# Patient Record
Sex: MALE | Race: BLACK OR AFRICAN AMERICAN | NOT HISPANIC OR LATINO | Employment: FULL TIME | URBAN - METROPOLITAN AREA
[De-identification: names, ages, dates, MRNs, and addresses within clinical notes are randomized per-mention and may not be internally consistent; named-entity substitution may affect disease eponyms.]

---

## 2017-03-23 DIAGNOSIS — G40.309 GENERALIZED CONVULSIVE EPILEPSY WITHOUT INTRACTABLE EPILEPSY (HCC): ICD-10-CM

## 2017-03-23 RX ORDER — LEVETIRACETAM 500 MG/1
TABLET ORAL
Qty: 180 TAB | Refills: 3 | Status: SHIPPED | OUTPATIENT
Start: 2017-03-23 | End: 2019-01-23

## 2018-01-25 ENCOUNTER — OFFICE VISIT (OUTPATIENT)
Dept: URGENT CARE | Facility: CLINIC | Age: 38
End: 2018-01-25
Payer: COMMERCIAL

## 2018-01-25 VITALS
OXYGEN SATURATION: 99 % | DIASTOLIC BLOOD PRESSURE: 80 MMHG | BODY MASS INDEX: 21.76 KG/M2 | HEART RATE: 95 BPM | WEIGHT: 152 LBS | TEMPERATURE: 99.7 F | SYSTOLIC BLOOD PRESSURE: 110 MMHG | HEIGHT: 70 IN

## 2018-01-25 DIAGNOSIS — J10.1 INFLUENZA B: ICD-10-CM

## 2018-01-25 LAB
FLUAV+FLUBV AG SPEC QL IA: NORMAL
INT CON NEG: NORMAL
INT CON NEG: NORMAL
INT CON POS: NORMAL
INT CON POS: NORMAL
S PYO AG THROAT QL: NEGATIVE

## 2018-01-25 PROCEDURE — 87880 STREP A ASSAY W/OPTIC: CPT | Performed by: FAMILY MEDICINE

## 2018-01-25 PROCEDURE — 87804 INFLUENZA ASSAY W/OPTIC: CPT | Performed by: FAMILY MEDICINE

## 2018-01-25 PROCEDURE — 99204 OFFICE O/P NEW MOD 45 MIN: CPT | Performed by: FAMILY MEDICINE

## 2018-01-25 RX ORDER — OSELTAMIVIR PHOSPHATE 75 MG/1
75 CAPSULE ORAL 2 TIMES DAILY
Qty: 10 CAP | Refills: 0 | Status: SHIPPED | OUTPATIENT
Start: 2018-01-25 | End: 2018-01-30

## 2018-01-26 NOTE — PATIENT INSTRUCTIONS
Chantelle Influenza   Chantelle influenza is also known as bird flu. Chantelle influenza is a group of viruses that occurs naturally in wild and domestic birds. Chantelle influenza is easily spread (contagious) among birds and is deadly to them. Though rare, chantelle influenza can cause severe illness in humans.  CAUSES  Infected birds can spread chantelle influenza through:  · Feces.  · Nasal secretions.  · Saliva.  Birds become infected when they come into contact with infected birds or contaminated surfaces. The chantelle influenza virus is spread from country to country through the international poultry trade or by migrating birds.  Chantelle influenza can then infect humans who:  · Have contact with infected birds. The birds can be dead or alive.  · Breathe in contaminated dust.  · Touch contaminated surfaces.  It is rare for there to be human-to-human transmission of chantelle influenza. However, influenza viruses can change, so human-to-human transmission may become more likely in the future.  RISK FACTORS  The main risk factor for chantelle influenza is having exposure to birds or poultry.  SIGNS AND SYMPTOMS  · Fever.  · Cough.  · Sore throat.  · Nausea and vomiting.  · Diarrhea.  · Muscle aches.  · Tiredness (fatigue).  · Inflammation or redness of the eyes (conjunctivitis).  · Shortness of breath.  · Difficulty breathing.  · Abdominal pain.  · Seizures.  · Mental confusion.  DIAGNOSIS  Diagnosis may include:  · Medical history and physical exam.  · Chest X-ray.  · Examination of a fluid sample from your throat or nose.  · Blood tests.  TREATMENT  Treatment for chantelle influenza may include:  · Antiviral medicines.  · Supportive care to relieve your symptoms.  HOME CARE INSTRUCTIONS  · Take medicines only as directed by your health care provider.  · Use a cool mist humidifier. This makes breathing easier.  · Rest as directed by your health care provider.  · Drink enough fluid to keep your urine clear or pale yellow.  · Cover your mouth and nose  when coughing or sneezing.  · Wash your hands well to prevent the virus from spreading.  · Avoid crowded areas. Stay home from work or school until directed by your health care provider.  PREVENTION  · Stay home from work and school when you are sick. Not being in contact with other people will help stop the spread of illness.  · Cover your mouth and nose with your arm when coughing or sneezing. This may help keep those around you from getting sick.  · Wash your hands often with warm water and soap. Illnesses are often spread when a person touches something that is contaminated with germs and then touches his or her eyes, nose, or mouth.  · If you think you have been exposed to chantelle influenza, ask your health care provider about preventative antiviral medicines. These can help prevent infection from occurring.  SEEK MEDICAL CARE IF:  · You have a fever.  · You have new symptoms.  · Your symptoms get worse.  · Your symptoms do not get better with treatment.  SEEK IMMEDIATE MEDICAL CARE IF:  · Your skin or nails turn bluish.  · You have a skin rash.  · You are urinating noticeably less or not at all.  · You have chest pain.  · You have trouble breathing.  · You feel like your heart is fluttering, skipping a beat, or beating faster than normal.  · You have confusion.  · You have chills, weakness, or light-headedness.  · You develop a sudden headache, or pain in your face or ear.  · You have severe pain or stiffness in your neck.  · You cough up blood.  · You have nausea and vomiting that you cannot control.        This information is not intended to replace advice given to you by your health care provider. Make sure you discuss any questions you have with your health care provider.     Document Released: 12/20/2004 Document Revised: 01/08/2016 Document Reviewed: 07/21/2015  [employer] Interactive Patient Education ©2016 [employer]

## 2018-01-26 NOTE — PROGRESS NOTES
"Chief Complaint   Patient presents with   • Cough     chest congestion,headache,sneezing x2days          Cough  This is a new problem. The current episode started yesterday. The problem has been unchanged. The problem occurs constantly. The cough is dry. Associated symptoms include : fatigue, headaches, chills, muscle aches, fever.  Sx are worse with exertion.    Pertinent negatives include no   nausea, vomiting, diarrhea, sweats, weight loss or wheezing. Nothing aggravates the symptoms.  Patient has tried nothing for the symptoms. There is no history of asthma.        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  .  Negative for wheezing.    Neurological: Negative for headaches, dizziness   GI - denies nausea, vomiting or diarrhea   - denies dysuria, discharge  Psych - denies depression, anxiety  Neuro - denies numbness or tingling.   10 point ROS otherwise negative, except per HPI             Objective:     Blood pressure 110/80, pulse 95, temperature 37.6 °C (99.7 °F), height 1.79 m (5' 10.47\"), weight 68.9 kg (152 lb), SpO2 99 %.      Physical Exam   Constitutional: patient is oriented to person, place, and time. Patient appears well-developed and well-nourished. No distress.   HENT:   Head: Normocephalic and atraumatic.   Right Ear: External ear normal.   Left Ear: External ear normal.   TMs normal  Nose: Mucosal edema  present. Right sinus exhibits no maxillary sinus tenderness. Left " sinus exhibits no maxillary sinus tenderness.   Mouth/Throat: Mucous membranes are normal. No oral lesions.  No posterior pharyngeal erythema.  No oropharyngeal exudate or posterior oropharyngeal edema.   Eyes: Conjunctivae and EOM are normal. Pupils are equal, round, and reactive to light. Right eye exhibits no discharge. Left eye exhibits no discharge. No scleral icterus.   Neck: Normal range of motion. Neck supple. No tracheal deviation present.   Cardiovascular: Normal rate, regular rhythm and normal heart sounds.  Exam reveals no friction rub.    Pulmonary/Chest: Effort normal. No respiratory distress. Patient has no wheezes or rhonchi. Patient has no rales.    Musculoskeletal:  exhibits no edema.   Lymphadenopathy:     Patient has no cervical adenopathy.      Neurological: patient is alert and oriented to person, place, and time.   Skin: Skin is warm and dry. No rash noted. No erythema.   Psychiatric: patient  has a normal mood and affect.  behavior is normal.   Nursing note and vitals reviewed.          Assesment/Plan:    Positive for Influenza B    Rx tamiflu    Follow up in one week if no improvement, sooner if symptoms worsen.

## 2018-04-05 ENCOUNTER — OFFICE VISIT (OUTPATIENT)
Dept: URGENT CARE | Facility: CLINIC | Age: 38
End: 2018-04-05
Payer: COMMERCIAL

## 2018-04-05 VITALS
WEIGHT: 152.12 LBS | HEART RATE: 70 BPM | OXYGEN SATURATION: 98 % | BODY MASS INDEX: 21.78 KG/M2 | TEMPERATURE: 97.8 F | RESPIRATION RATE: 16 BRPM | HEIGHT: 70 IN | DIASTOLIC BLOOD PRESSURE: 62 MMHG | SYSTOLIC BLOOD PRESSURE: 100 MMHG

## 2018-04-05 DIAGNOSIS — G40.409 GRAND MAL EPILEPSY, CONTROLLED (HCC): ICD-10-CM

## 2018-04-05 PROCEDURE — 99203 OFFICE O/P NEW LOW 30 MIN: CPT | Performed by: INTERNAL MEDICINE

## 2018-04-05 RX ORDER — LEVETIRACETAM 500 MG/1
500 TABLET ORAL 2 TIMES DAILY
Qty: 60 TAB | Refills: 6 | Status: SHIPPED | OUTPATIENT
Start: 2018-04-05 | End: 2019-01-23 | Stop reason: SDUPTHER

## 2018-04-05 ASSESSMENT — ENCOUNTER SYMPTOMS
MUSCULOSKELETAL NEGATIVE: 1
RESPIRATORY NEGATIVE: 1
EYES NEGATIVE: 1
CONSTITUTIONAL NEGATIVE: 1
SEIZURES: 1
CARDIOVASCULAR NEGATIVE: 1
GASTROINTESTINAL NEGATIVE: 1
PSYCHIATRIC NEGATIVE: 1

## 2018-04-05 NOTE — PROGRESS NOTES
"Sanjay Newberry is a 37 y.o. 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  MEDS:   Current Outpatient Prescriptions:   •  levETIRAcetam (KEPPRA) 500 MG Tab, Take 1 Tab by mouth 2 times a day., Disp: 60 Tab, Rfl: 6  •  levetiracetam (KEPPRA) 500 MG Tab, TAKE ONE TABLET BY MOUTH TWICE DAILY, Disp: 180 Tab, Rfl: 3  ALLERGIES: No Known Allergies  SURGHX: History reviewed. No pertinent surgical history.  SOCHX:  reports that he has never smoked. He has never used smokeless tobacco. He reports that he does not drink alcohol or use drugs.  FH: family history is not on file.    ROS  No Known Allergies   Objective:   /62   Pulse 70   Temp 36.6 °C (97.8 °F)   Resp 16   Ht 1.79 m (5' 10.47\")   Wt 69 kg (152 lb 1.9 oz)   SpO2 98%   BMI 21.54 kg/m²   Physical Exam      Assessment/Plan:   Assessment    1. Grand mal epilepsy, controlled (CMS-[facility])  - levETIRAcetam (KEPPRA) 500 MG Tab; Take 1 Tab by mouth 2 times a day.  Dispense: 60 Tab; Refill: 6  Differential diagnosis, natural history, supportive care, and indications for immediate follow-up discussed.        "

## 2018-04-05 NOTE — PROGRESS NOTES
"Subjective:      Sanjay Newberry is a 37 y.o. male who presents with Medication Refill (medication refill levetiracetam for seizures - pt has appt with neurologist in August)            Patient is a 37-year-old male with long-standing history of grand mal seizures as well as a intracerebral hemorrhage in the past. Patient is here for medication refill of his Keppra. Patient has been seizure-free for several years. Patient denies any other significant complaints. Eating and drinking his bowels and bladder well no other complaints. Recently moved here from [place] Working at [employer]        Review of Systems   Constitutional: Negative.    HENT: Negative.    Eyes: Negative.    Respiratory: Negative.    Cardiovascular: Negative.    Gastrointestinal: Negative.    Genitourinary: Negative.    Musculoskeletal: Negative.    Neurological: Positive for seizures.   Endo/Heme/Allergies: Negative.    Psychiatric/Behavioral: Negative.    All other systems reviewed and are negative.         Objective:     /62   Pulse 70   Temp 36.6 °C (97.8 °F)   Resp 16   Ht 1.79 m (5' 10.47\")   Wt 69 kg (152 lb 1.9 oz)   SpO2 98%   BMI 21.54 kg/m²      Physical Exam   Constitutional: He is oriented to person, place, and time. He appears well-developed and well-nourished.   HENT:   Head: Normocephalic and atraumatic.   Eyes: Lids are normal. Pupils are equal, round, and reactive to light. Left eye exhibits abnormal extraocular motion. Left eye exhibits no nystagmus.   Neck: Normal range of motion. Neck supple.   Cardiovascular: Normal rate and regular rhythm.    Pulmonary/Chest: Effort normal and breath sounds normal.   Neurological: He is alert and oriented to person, place, and time.   Skin: Skin is warm and dry.   Psychiatric: He has a normal mood and affect. His behavior is normal. Judgment and thought content normal.   Nursing note and vitals reviewed.              Assessment/Plan:     1. Grand mal epilepsy, controlled (CMS-[facility])    - " levETIRAcetam (KEPPRA) 500 MG Tab; Take 1 Tab by mouth 2 times a day.  Dispense: 60 Tab; Refill: 6    Patient establishes a neurologist states that he is appointment in August. Patient also to establish with a primary care doctor ASAP.

## 2018-10-31 ENCOUNTER — OFFICE VISIT (OUTPATIENT)
Dept: URGENT CARE | Facility: CLINIC | Age: 38
End: 2018-10-31
Payer: COMMERCIAL

## 2018-10-31 VITALS
DIASTOLIC BLOOD PRESSURE: 60 MMHG | HEIGHT: 74 IN | OXYGEN SATURATION: 98 % | HEART RATE: 74 BPM | BODY MASS INDEX: 19.51 KG/M2 | WEIGHT: 152 LBS | RESPIRATION RATE: 16 BRPM | TEMPERATURE: 97.7 F | SYSTOLIC BLOOD PRESSURE: 132 MMHG

## 2018-10-31 DIAGNOSIS — Z87.898 HISTORY OF SEIZURE: ICD-10-CM

## 2018-10-31 DIAGNOSIS — Z76.0 MEDICATION REFILL: ICD-10-CM

## 2018-10-31 PROCEDURE — 99212 OFFICE O/P EST SF 10 MIN: CPT | Performed by: PHYSICIAN ASSISTANT

## 2018-10-31 RX ORDER — LEVETIRACETAM 500 MG/1
500 TABLET ORAL 2 TIMES DAILY
Qty: 60 TAB | Refills: 2 | Status: SHIPPED | OUTPATIENT
Start: 2018-10-31 | End: 2018-11-30

## 2018-10-31 ASSESSMENT — ENCOUNTER SYMPTOMS
HEADACHES: 0
EYES NEGATIVE: 1
CONSTITUTIONAL NEGATIVE: 1
WEAKNESS: 0
NEUROLOGICAL NEGATIVE: 1

## 2018-10-31 NOTE — PROGRESS NOTES
Subjective:      Sanjay Newberry is a 38 y.o. male who presents with Medication Refill (siezure medication)            Other   This is a chronic (sz hx; stable on meds; needs refill; has Neuro appt Jan 23) problem. The problem has been unchanged. Pertinent negatives include no headaches or weakness. Nothing aggravates the symptoms. Treatments tried: rx meds. The treatment provided significant relief.       Review of Systems   Constitutional: Negative.    HENT: Negative.    Eyes: Negative.    Skin: Negative.    Neurological: Negative.  Negative for weakness and headaches.          Objective:     There were no vitals taken for this visit.     Physical Exam   Constitutional: He is oriented to person, place, and time. He appears well-developed and well-nourished. No distress.   HENT:   Head: Normocephalic and atraumatic.   Eyes: Pupils are equal, round, and reactive to light. EOM are normal.   Neurological: He is alert and oriented to person, place, and time. No cranial nerve deficit or sensory deficit. He exhibits normal muscle tone. Coordination normal.   Skin: Skin is warm and dry. Capillary refill takes less than 2 seconds.   Psychiatric: He has a normal mood and affect. His behavior is normal.   Nursing note and vitals reviewed.    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 60 Tab 6     No current facility-administered  medications on file prior to visit.      Social History     Social History   • Marital status: Single     Spouse name: N/A   • Number of children: N/A   • Years of education: N/A     Occupational History   • Not on file.     Social History Main Topics   • Smoking status: Never Smoker   • Smokeless tobacco: Never Used   • Alcohol use No   • Drug use: No   • Sexual activity: Not Currently     Partners: Female     Other Topics Concern   • Not on file     Social History Narrative   • No narrative on file     History reviewed. No pertinent family history.  Patient has no known allergies.              Assessment/Plan:     ·  sz hx; stable on meds;   · Med refill      ·

## 2019-01-23 ENCOUNTER — OFFICE VISIT (OUTPATIENT)
Dept: NEUROLOGY | Facility: MEDICAL CENTER | Age: 39
End: 2019-01-23

## 2019-01-23 VITALS
SYSTOLIC BLOOD PRESSURE: 128 MMHG | BODY MASS INDEX: 19.61 KG/M2 | OXYGEN SATURATION: 100 % | HEIGHT: 73 IN | DIASTOLIC BLOOD PRESSURE: 74 MMHG | TEMPERATURE: 97.4 F | HEART RATE: 98 BPM | WEIGHT: 148 LBS

## 2019-01-23 DIAGNOSIS — G40.409 GRAND MAL EPILEPSY, CONTROLLED (HCC): Primary | ICD-10-CM

## 2019-01-23 PROCEDURE — 99205 OFFICE O/P NEW HI 60 MIN: CPT | Performed by: PSYCHIATRY & NEUROLOGY

## 2019-01-23 RX ORDER — LEVETIRACETAM 500 MG/1
500 TABLET ORAL 2 TIMES DAILY
Qty: 180 TAB | Refills: 3 | Status: SHIPPED
Start: 2019-01-23 | End: 2020-01-23 | Stop reason: SDUPTHER

## 2019-01-23 ASSESSMENT — PATIENT HEALTH QUESTIONNAIRE - PHQ9: CLINICAL INTERPRETATION OF PHQ2 SCORE: 0

## 2019-01-23 ASSESSMENT — ENCOUNTER SYMPTOMS
LOSS OF CONSCIOUSNESS: 0
DEPRESSION: 0
TREMORS: 0
WEIGHT LOSS: 0
DIARRHEA: 0
SEIZURES: 0
HEADACHES: 0
MEMORY LOSS: 0
TINGLING: 0
SENSORY CHANGE: 0

## 2019-01-23 NOTE — PROGRESS NOTES
Subjective:      Sanjay Newberry is a 38 y.o. male who presents upon referral from the office of Valerio Spence PA-C, for consultation, with a history of symptomatic seizures.     KATHY Grace is a pleasant 38-year-old right-handed [place] American gentleman whose seizures started in 2001, suffering from several following that, on medication through 2004 and then with a recurrence in October, 2014, his last seizure in April, 2015.    The events have never been preceded by any aura symptoms of déjà vu, olfactory or gustatory hallucination, focal left-sided motor or sensory distortions, visual abnormalities, etc.  He is amnestic for the events, witnessed by his family, they describe generalized convulsions, he can be incontinent, will typically chew on his tongue, etc.  He awakens slowly, he knows he is confused, muscles are sore, he is tired, he can have headache.  He does have full recovery.    Between the events he denies transient episodes of left-sided focal motor or sensory distortions, with slight confusion, staring, etc.    I had first seen him in June, 2015, 2 months after his last seizure, he had yet to start an antiepileptic.  I reviewed the electronic health record, he was compliant with medication, denying any issues of side effect or adverse event such as headache, personality change, tremor, dizziness, malaise, drowsiness throughout the day, or fatigue.     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  EEG studies were unremarkable, I repeated another study in July, 2015, showing only minimal frontal slowing with paroxysmal qualities but no clear epileptiform activity.    He was placed on Keppra 500 mg, twice daily, has been compliant since that time.  He was then lost to follow-up, but in the ensuing 3+ years, he has  "been stable.  Following up in urgent care centers, he was told he needs to follow-up with a neurologist, he now presents.    Medical history is remarkable for surgery for some type of \"tumor\" when he was still in [facility].  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  None of this has changed.    Medical history is otherwise unremarkable for diabetes, hypertension, CVA, CAD, PVD, malignancy, thyroid disease, liver or kidney disease, psychiatric disease, blood dyscrasia, MS, or neuro degenerative disease.  There is no other surgical history of note.    Known in the family has a history of seizures or other significant neurologic disease, his birth and developmental histories are unremarkable.  There is no history of febrile convulsions, there was never a problem with developmental or cognitive delay.  He does not smoke or drink, he is an  and , now working for [employer]. He is on Keppra 500 mg, twice daily.    Review of Systems   Constitutional: Negative for malaise/fatigue and weight loss.   Gastrointestinal: Negative for diarrhea.   Neurological: Negative for tingling, tremors, sensory change, seizures, loss of consciousness and headaches.   Psychiatric/Behavioral: Negative for depression and memory loss.   All other systems reviewed and are negative.       Objective:     /74 (BP Location: Left arm, Patient Position: Sitting, BP Cuff Size: Adult)   Pulse 98   Temp 36.3 °C (97.4 °F) (Temporal)   Ht 1.854 m (6' 1\")   Wt 67.1 kg (148 lb)   SpO2 100%   BMI 19.53 kg/m²      Physical Exam    He appears in no acute distress.  Clean and appropriately dressed, he is quite cooperative, in very pleasant spirit and demeanor.  His vital signs are stable.  There is no malar rash or temporal tenderness.  The neck is supple, range of motion is full, carotid pulses are present without asymmetry.  Cardiac evaluation reveals a regular rhythm.  There is " no evidence of diffuse rash, bruising, the joints are nonswollen and nontender.    He is fully oriented, there is no evidence of aphasia, agnosia, apraxia, or inattention.  Cognition is for the most part intact.    The left pupil is nonreactive to direct stimulus, there is no consensual response with the right pupil, but consensual response is present when the right pupil is stimulated.  Visual fields are full with the right eye on finger counting.  There is a mild left facial droop, smile is still symmetric, sensory exam is intact to light touch and temperature bilaterally, the tongue and uvula are midline, there is no dysarthria, shoulder shrug and head rotation are intact bilaterally.    Musculoskeletal exam reveals no tremor, asterixis, or drift.  Strength is 5/5 bilaterally, reflexes are brisk and present throughout, none are dropped, there are no asymmetries.  The left toe is equivocal, the right downgoing on plantar stimulation.    Fine motor control and repetitive movements are intact and symmetric in all 4 extremities, amplitude and frequencies are normal.  There is no appendicular dystaxia.  He walks with normal station, arm swing is symmetric, heel, toe, and tandem walking are intact.    Sensory exam is intact to vibration, temperature and light touch, there is no sensory extinction on the left with double simultaneous stimulation, Romberg is absent.     Assessment/Plan:     1. Grand mal epilepsy, controlled (HCC)  Clinically he is stable, given the structural abnormalities on imaging from his old surgery, I think his seizure recurrence risk is high, even though he has good control for the last 3+ years.  His risk of having a spontaneous recurrence were we to consider getting him off drug, even if another EEG is unremarkable, is quite high, the other problem is that his seizures seem to be focal with very rapid, secondary generalization, and thus makes it even more problematic if he is to continue to  drive safely.    All of the above was reviewed at length, he is safe on Keppra, tolerating the drug well, I think long-term this is the best option by keeping him on drug.  He is comfortable with this.  CBC, CMP, drug level and vitamin D levels will be checked as baselines, he was told he must follow-up with me on a regular, annual basis to maintain safety.  We talked about circumstances that lower seizure threshold, circumstances under which he must go to emergency room versus simply contacting the office for seizure recurrence.  I was also very specific about obtaining some type of medical identification and the rationale for this.    Long-term prognosis was also reviewed.  He will continue to do well I suspect as long as he is compliant.  We talked about the risk of osteopenia and osteoporosis in epileptic patients, fortunately the drug he is on is not associated with an elevated risk.  We will call him with test results if they are abnormal, he knows to call the office if he were to have seizure recurrences.  EEG does not need to be repeated at this time as long as he remains stable.  Obviously there is no reason to repeat imaging.  A year follow-up is scheduled otherwise.    - levETIRAcetam (KEPPRA) 500 MG Tab; Take 1 Tab by mouth 2 times a day.  Dispense: 180 Tab; Refill: 3  - CBC WITH DIFFERENTIAL; Future  - COMP METABOLIC PANEL; Future  - VITAMIN 1,25 DIHYDROXY; Future  - LEVETIRACETAM (KEPPRA), S    Time: 60 minutes spent face-to-face for exam, review, discussion, education, of this over 60% spent face-to-face counseling and coordinating care surrounding all of the above issues.

## 2020-01-23 ENCOUNTER — OFFICE VISIT (OUTPATIENT)
Dept: NEUROLOGY | Facility: MEDICAL CENTER | Age: 40
End: 2020-01-23
Payer: COMMERCIAL

## 2020-01-23 VITALS
HEIGHT: 69 IN | SYSTOLIC BLOOD PRESSURE: 100 MMHG | BODY MASS INDEX: 22.22 KG/M2 | OXYGEN SATURATION: 98 % | DIASTOLIC BLOOD PRESSURE: 72 MMHG | HEART RATE: 70 BPM | WEIGHT: 150 LBS

## 2020-01-23 DIAGNOSIS — G40.409 GRAND MAL EPILEPSY, CONTROLLED (HCC): Primary | ICD-10-CM

## 2020-01-23 PROCEDURE — 99213 OFFICE O/P EST LOW 20 MIN: CPT | Performed by: PSYCHIATRY & NEUROLOGY

## 2020-01-23 RX ORDER — LEVETIRACETAM 500 MG/1
500 TABLET ORAL 2 TIMES DAILY
Qty: 180 TAB | Refills: 3 | Status: SHIPPED | OUTPATIENT
Start: 2020-01-23 | End: 2021-02-01 | Stop reason: SDUPTHER

## 2020-01-23 ASSESSMENT — ENCOUNTER SYMPTOMS
MEMORY LOSS: 0
LOSS OF CONSCIOUSNESS: 0
SEIZURES: 0

## 2020-01-24 NOTE — PROGRESS NOTES
"Subjective:      Sanjay Newberry is a 39 y.o. male who presents for annual follow-up, with a history of generalized seizures with bilateral motor involuntary movements.     KATHY Perry states that he has been seizure-free, he remains on Keppra 500 mg, twice daily.  He is compliant.  He has never had problems with side effects and other adverse events.    When he was last seen, I had wanted to check a drug level, vitamin D as well as metabolic profile and CBC.  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    Medical, surgical and family histories are reviewed, there are no new drug allergies.  He is on Keppra 500 mg, twice daily.    Review of Systems   Constitutional: Negative for malaise/fatigue.   Neurological: Negative for seizures and loss of consciousness.   Psychiatric/Behavioral: Negative for memory loss.   All other systems reviewed and are negative.       Objective:     /72 (BP Location: Left arm, Patient Position: Sitting, BP Cuff Size: Adult)   Pulse 70   Ht 1.753 m (5' 9\")   Wt 68 kg (150 lb)   SpO2 98%   BMI 22.15 kg/m²      Physical Exam    He appears in no acute distress.  Quite pleasant in spirit and demeanor, he is quite cooperative.  Vital signs are stable.  There is no malar rash.  The neck is supple.  Cardiac evaluation is unremarkable.    Cognition is intact, fully oriented, there is no aphasia or inattention.  The left pupil remains nonreactive, there is no light perception OS.  The right pupil shows good response to direct stimulation, visual fields are full to finger counting.  Facial movements are symmetric, there is no sensory loss over the right side of the face.  The tongue and uvula are midline.    Musculoskeletal exam reveals normal tone bilaterally, there is no tremor or drift.  Strength is 5/5 in all 4 extremities.  There are no reflex asymmetries.  He stands easily, arm swing symmetric, station is normal, heel, toe, and tandem walking are all normal.  There " is no appendicular dystaxia, fine motor control is intact in all 4 extremities.  Sensory exam is intact to vibration, Romberg is absent.     Assessment/Plan:     1. Grand mal epilepsy, controlled (HCC)  Stable clinically, Keon got his wrist slapped regarding his blood work, he was told he needs to get this drawn, he insists he will comply.  We will follow-up in 1 year.  We will call him with the results if they are abnormal and significant, warranting a change in his therapy.    - levETIRAcetam (KEPPRA) 500 MG Tab; Take 1 Tab by mouth 2 times a day.  Dispense: 180 Tab; Refill: 3  - KEPPRA; Future  - VITAMIN D,25 HYDROXY; Future  - Comp Metabolic Panel; Future  - CBC WITH DIFFERENTIAL; Future    Time: 20-minute spent face-to-face for exam, review, discussion, and education, of this over 50% of the time spent counseling and coordinating care.

## 2020-02-07 ENCOUNTER — HOSPITAL ENCOUNTER (OUTPATIENT)
Dept: LAB | Facility: MEDICAL CENTER | Age: 40
End: 2020-02-07
Attending: PSYCHIATRY & NEUROLOGY
Payer: COMMERCIAL

## 2020-02-07 DIAGNOSIS — G40.409 GRAND MAL EPILEPSY, CONTROLLED (HCC): ICD-10-CM

## 2020-02-07 LAB
25(OH)D3 SERPL-MCNC: 15 NG/ML (ref 30–100)
ALBUMIN SERPL BCP-MCNC: 4.4 G/DL (ref 3.2–4.9)
ALBUMIN/GLOB SERPL: 1.6 G/DL
ALP SERPL-CCNC: 75 U/L (ref 30–99)
ALT SERPL-CCNC: 29 U/L (ref 2–50)
ANION GAP SERPL CALC-SCNC: 5 MMOL/L (ref 0–11.9)
AST SERPL-CCNC: 18 U/L (ref 12–45)
BASOPHILS # BLD AUTO: 0.6 % (ref 0–1.8)
BASOPHILS # BLD: 0.03 K/UL (ref 0–0.12)
BILIRUB SERPL-MCNC: 0.5 MG/DL (ref 0.1–1.5)
BUN SERPL-MCNC: 18 MG/DL (ref 8–22)
CALCIUM SERPL-MCNC: 9.5 MG/DL (ref 8.5–10.5)
CHLORIDE SERPL-SCNC: 106 MMOL/L (ref 96–112)
CO2 SERPL-SCNC: 29 MMOL/L (ref 20–33)
CREAT SERPL-MCNC: 0.84 MG/DL (ref 0.5–1.4)
EOSINOPHIL # BLD AUTO: 0.26 K/UL (ref 0–0.51)
EOSINOPHIL NFR BLD: 4.8 % (ref 0–6.9)
ERYTHROCYTE [DISTWIDTH] IN BLOOD BY AUTOMATED COUNT: 42.1 FL (ref 35.9–50)
GLOBULIN SER CALC-MCNC: 2.7 G/DL (ref 1.9–3.5)
GLUCOSE SERPL-MCNC: 78 MG/DL (ref 65–99)
HCT VFR BLD AUTO: 49.5 % (ref 42–52)
HGB BLD-MCNC: 16.7 G/DL (ref 14–18)
IMM GRANULOCYTES # BLD AUTO: 0.01 K/UL (ref 0–0.11)
IMM GRANULOCYTES NFR BLD AUTO: 0.2 % (ref 0–0.9)
LYMPHOCYTES # BLD AUTO: 1.98 K/UL (ref 1–4.8)
LYMPHOCYTES NFR BLD: 36.4 % (ref 22–41)
MCH RBC QN AUTO: 31 PG (ref 27–33)
MCHC RBC AUTO-ENTMCNC: 33.7 G/DL (ref 33.7–35.3)
MCV RBC AUTO: 91.8 FL (ref 81.4–97.8)
MONOCYTES # BLD AUTO: 0.4 K/UL (ref 0–0.85)
MONOCYTES NFR BLD AUTO: 7.4 % (ref 0–13.4)
NEUTROPHILS # BLD AUTO: 2.76 K/UL (ref 1.82–7.42)
NEUTROPHILS NFR BLD: 50.6 % (ref 44–72)
NRBC # BLD AUTO: 0 K/UL
NRBC BLD-RTO: 0 /100 WBC
PLATELET # BLD AUTO: 197 K/UL (ref 164–446)
PMV BLD AUTO: 11.1 FL (ref 9–12.9)
POTASSIUM SERPL-SCNC: 4.6 MMOL/L (ref 3.6–5.5)
PROT SERPL-MCNC: 7.1 G/DL (ref 6–8.2)
RBC # BLD AUTO: 5.39 M/UL (ref 4.7–6.1)
SODIUM SERPL-SCNC: 140 MMOL/L (ref 135–145)
WBC # BLD AUTO: 5.4 K/UL (ref 4.8–10.8)

## 2020-02-07 PROCEDURE — 36415 COLL VENOUS BLD VENIPUNCTURE: CPT

## 2020-02-07 PROCEDURE — 82306 VITAMIN D 25 HYDROXY: CPT

## 2020-02-07 PROCEDURE — 85025 COMPLETE CBC W/AUTO DIFF WBC: CPT

## 2020-02-07 PROCEDURE — 80177 DRUG SCRN QUAN LEVETIRACETAM: CPT

## 2020-02-07 PROCEDURE — 80053 COMPREHEN METABOLIC PANEL: CPT

## 2020-02-08 LAB — LEVETIRACETAM SERPL-MCNC: 6 UG/ML (ref 12–46)

## 2020-06-26 ENCOUNTER — OFFICE VISIT (OUTPATIENT)
Dept: URGENT CARE | Facility: CLINIC | Age: 40
End: 2020-06-26
Payer: COMMERCIAL

## 2020-06-26 ENCOUNTER — OCCUPATIONAL MEDICINE (OUTPATIENT)
Dept: URGENT CARE | Facility: CLINIC | Age: 40
End: 2020-06-26
Payer: COMMERCIAL

## 2020-06-26 ENCOUNTER — APPOINTMENT (OUTPATIENT)
Dept: RADIOLOGY | Facility: IMAGING CENTER | Age: 40
End: 2020-06-26
Attending: NURSE PRACTITIONER
Payer: COMMERCIAL

## 2020-06-26 VITALS
BODY MASS INDEX: 21.92 KG/M2 | SYSTOLIC BLOOD PRESSURE: 120 MMHG | DIASTOLIC BLOOD PRESSURE: 72 MMHG | HEIGHT: 69 IN | WEIGHT: 148 LBS | OXYGEN SATURATION: 95 % | HEART RATE: 65 BPM
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
## 2020-06-26 DIAGNOSIS — M79.602 LEFT ARM PAIN: ICD-10-CM

## 2020-06-26 PROCEDURE — 73090 X-RAY EXAM OF FOREARM: CPT | Mod: TC,LT | Performed by: NURSE PRACTITIONER

## 2020-06-26 PROCEDURE — 99214 OFFICE O/P EST MOD 30 MIN: CPT | Performed by: NURSE PRACTITIONER

## 2020-06-26 ASSESSMENT — FIBROSIS 4 INDEX: FIB4 SCORE: 0.68

## 2020-06-26 ASSESSMENT — ENCOUNTER SYMPTOMS: MYALGIAS: 1

## 2020-06-26 NOTE — PROGRESS NOTES
Subjective:   Sanjay Newberry is a 40 y.o. male who presents for Arm Pain (left arm pain)       HPI  Pt presents for evaluation of a new problem, reports left forearm pain over the past few weeks.  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  Denies any direct injury or trauma, numbness or tingling. Is right handed.  Has continued to be able to work, notices his forearm tenderness decreases on his days off when he rests more.     Review of Systems   Musculoskeletal: Positive for myalgias (left forearm).   All other systems reviewed and are negative.      MEDS:   Current Outpatient Medications:   •  levETIRAcetam (KEPPRA) 500 MG Tab, Take 1 Tab by mouth 2 times a day., Disp: 180 Tab, Rfl: 3  ALLERGIES: No Known Allergies    Patient's PMH, SocHx, SurgHx, FamHx, Drug allergies and medications were reviewed.     Objective:   There were no vitals taken for this visit.    Physical Exam   Constitutional: Vital signs reviewed. Alert and oriented to person, place, and time. Appears well-developed and well-nourished, in no acute distress.   HEENT: Head is normocephalic; nontraumatic external ears normal with normal hearing; sclera white, conjunctivae clear.  Car: Regular rate and rhythm.  Pulmonary/Chest: Respirations non-labored, normal phonation.  Musculoskeletal: Normal gait. No muscular atrophy or weakness. FROM left arm, 5/5 strength bilaterally. Negative Phalen's testing.   Neurological: Muscle tone normal. Coordination normal.   Skin: Negative for rash or suspected lesions in visible areas.   Psychiatric: Normal mood and affect. Behavior is normal. Judgment and thought content normal.       Assessment/Plan:   Assessment    1. Left arm pain  - DX-FOREARM LEFT; Future    Recommend RICE therapies, and OTC NSAIDs.  Supportive care options also discussed.  Differential diagnosis, natural history,  supportive care, and indications for immediate follow-up were discussed.     Return to urgent care clinic or PCP if current symptoms do not improve and/or worsening symptoms occur. Advised of signs and symptoms which would warrant further evaluation and /or emergent evaluation in ER.  All questions answered and the patient agrees to the plan of care.

## 2020-07-17 ENCOUNTER — OFFICE VISIT (OUTPATIENT)
Dept: URGENT CARE | Facility: CLINIC | Age: 40
End: 2020-07-17
Payer: COMMERCIAL

## 2020-07-17 VITALS
OXYGEN SATURATION: 100 % | RESPIRATION RATE: 14 BRPM | DIASTOLIC BLOOD PRESSURE: 72 MMHG | HEIGHT: 70 IN | BODY MASS INDEX: 21.76 KG/M2 | HEART RATE: 74 BPM | SYSTOLIC BLOOD PRESSURE: 120 MMHG | WEIGHT: 152 LBS | TEMPERATURE: 98 F

## 2020-07-17 DIAGNOSIS — R21 PITYRIASIS ROSEA-LIKE SKIN ERUPTION: ICD-10-CM

## 2020-07-17 DIAGNOSIS — R21 RASH AND NONSPECIFIC SKIN ERUPTION: ICD-10-CM

## 2020-07-17 PROCEDURE — 99214 OFFICE O/P EST MOD 30 MIN: CPT | Performed by: PHYSICIAN ASSISTANT

## 2020-07-17 RX ORDER — TRIAMCINOLONE ACETONIDE 0.25 MG/G
CREAM TOPICAL
Qty: 100 G | Refills: 1 | Status: SHIPPED | OUTPATIENT
Start: 2020-07-17 | End: 2023-08-02

## 2020-07-17 ASSESSMENT — ENCOUNTER SYMPTOMS
COUGH: 0
CHILLS: 0
VOMITING: 0
NAUSEA: 0
FEVER: 0
SORE THROAT: 0
SHORTNESS OF BREATH: 0

## 2020-07-17 ASSESSMENT — FIBROSIS 4 INDEX: FIB4 SCORE: 0.68

## 2020-07-17 NOTE — PATIENT INSTRUCTIONS
Pityriasis Rosea  Pityriasis rosea is a rash that usually appears on the chest, abdomen, and back. It may also appear on the upper arms and upper legs. It usually begins as a single patch, and then more patches start to develop. The rash may cause mild itching, but it normally does not cause other problems. It usually goes away without treatment. However, it may take weeks or months for the rash to go away completely.  What are the causes?  The cause of this condition is not known. The condition does not spread from person to person (is not contagious).  What increases the risk?  This condition is more likely to develop in:  · Persons aged 10-35 years.  · Pregnant women.  It is more common in the spring and fall seasons.  What are the signs or symptoms?  The main symptom of this condition is a rash.  · The rash usually begins with a single oval patch that is larger than the ones that follow. This is called a herald patch. It generally appears a week or more before the rest of the rash appears.  · When more patches start to develop, they spread quickly on the chest, abdomen, back, arms, and legs. These patches are smaller than the first one.  · The patches that make up the rash are usually oval-shaped and pink or red in color. They are usually flat but may sometimes be raised so that they can be felt with a finger. They may also be finely crinkled and have a scaly ring around the edge.  Some people may have mild itching and nonspecific symptoms, such as:  · Nausea.  · Loss of appetite.  · Difficulty concentrating.  · Headache.  · Irritability.  · Sore throat.  · Mild fever.  How is this diagnosed?  This condition may be diagnosed based on:  · Your medical history and a physical exam.  · Tests to rule out other causes. This may include blood tests or a test in which a small sample of skin is removed from the rash (biopsy) and checked in a lab.  How is this treated?         Treatment is not usually needed for this  condition. The rash will often go away on its own in 4-8 weeks. In some cases, a health care provider may recommend or prescribe medicine to reduce itching.  Follow these instructions at home:  · Take or apply over-the-counter and prescription medicines only as told by your health care provider.  · Avoid scratching the affected areas of skin.  · Do not take hot baths or use a sauna. Use only warm water when bathing or showering. Heat can increase itching. Adding cornstarch to your bath may help to relieve the itching.  · Avoid exposure to the sun and other sources of UV light, such as tanning beds, as told by your health care provider. UV light may help the rash go away but may cause unwanted changes in skin color.  · Keep all follow-up visits as told by your health care provider. This is important.  Contact a health care provider if:  · Your rash does not go away in 8 weeks.  · Your rash gets much worse.  · You have a fever.  · You have swelling or pain in the rash area.  · You have fluid, blood, or pus coming from the rash area.  Summary  · Pityriasis rosea is a rash that usually appears on the trunk of the body. It can also appear on the upper arms and upper legs.  · The rash usually begins with a single oval patch (herald patch) that appears a week or more before the rest of the rash appears. The herald patch is larger than the ones that follow.  · The rash may cause mild itching, but it usually does not cause other problems. It usually goes away without treatment in 4-8 weeks.  · In some cases, a health care provider may recommend or prescribe medicine to reduce itching.  This information is not intended to replace advice given to you by your health care provider. Make sure you discuss any questions you have with your health care provider.  Document Released: 01/24/2003 Document Revised: 12/17/2018 Document Reviewed: 12/17/2018  Elsevier Patient Education © 2020 [employer]

## 2020-07-17 NOTE — PROGRESS NOTES
"Subjective:   Sanjay Newberry  is a 40 y.o. male who presents for Rash (rash on abd area, both sides x 1-2 weeks)      Rash   Pertinent negatives include no congestion, cough, fever, shortness of breath, sore throat or vomiting.   Patient comes clinic noting last 1 to 2 weeks of slightly itchy rash.  He notes rash began with a larger spot on right anterior thigh.  Has had progression to both sides of abdomen the little bit the back and upper arms over biceps.  Denies other symptoms: Denies fevers chills sore throat or cough, denies ear pain nausea or vomiting.  Denies abdominal pain diarrhea.  Denies history of similar rash.  Has tried no treatments thus far.      Review of Systems   Constitutional: Negative for chills and fever.   HENT: Negative for congestion, ear pain and sore throat.    Respiratory: Negative for cough and shortness of breath.    Gastrointestinal: Negative for nausea and vomiting.   Skin: Positive for itching and rash.       No Known Allergies     Objective:   /72 (BP Location: Left arm, Patient Position: Sitting, BP Cuff Size: Adult)   Pulse 74   Temp 36.7 °C (98 °F) (Temporal)   Resp 14   Ht 1.778 m (5' 10\")   Wt 68.9 kg (152 lb)   SpO2 100%   BMI 21.81 kg/m²     Physical Exam  Vitals signs and nursing note reviewed.   Constitutional:       General: He is not in acute distress.     Appearance: He is well-developed. He is not diaphoretic.   HENT:      Head: Normocephalic and atraumatic.      Right Ear: External ear normal.      Left Ear: External ear normal.      Nose: Nose normal.   Eyes:      General: No scleral icterus.        Right eye: No discharge.         Left eye: No discharge.      Conjunctiva/sclera: Conjunctivae normal.   Neck:      Musculoskeletal: Neck supple.   Pulmonary:      Effort: Pulmonary effort is normal. No respiratory distress.   Musculoskeletal: Normal range of motion.   Skin:     General: Skin is warm and dry.      Coloration: Skin is not pale.      Findings: " Rash present.      Comments: Torso upper thighs and upper arms with slightly raised, few scabbed, slightly salmon-colored and oval-shaped lesions consistent with pityriasis-like eruption, likely herald patch to right anterior thigh with larger area, non-urticarial, nonpustular, nonvesicular   Neurological:      Mental Status: He is alert and oriented to person, place, and time.      Coordination: Coordination normal.         Assessment/Plan:   1. Pityriasis rosea-like skin eruption  - triamcinolone acetonide (KENALOG) 0.025 % Cream; Apply to affected area twice daily  Dispense: 100 g; Refill: 1    2. Rash and nonspecific skin eruption  Supportive care is reviewed with patient/caregiver - recommend to push PO fluids and electrolytes, sent with handout on condition, Kenalog for itching, discussed typical timeframe and anticipated self resolution  Return to clinic with lack of resolution or progression of symptoms.      I have worn an N95 mask, gloves and eye protection for the entire encounter with this patient.     Differential diagnosis, natural history, supportive care, and indications for immediate follow-up discussed.

## 2021-02-01 ENCOUNTER — OFFICE VISIT (OUTPATIENT)
Dept: NEUROLOGY | Facility: MEDICAL CENTER | Age: 41
End: 2021-02-01
Attending: PSYCHIATRY & NEUROLOGY
Payer: COMMERCIAL

## 2021-02-01 VITALS
BODY MASS INDEX: 22.89 KG/M2 | HEART RATE: 71 BPM | TEMPERATURE: 98.4 F | OXYGEN SATURATION: 100 % | DIASTOLIC BLOOD PRESSURE: 70 MMHG | HEIGHT: 69 IN | SYSTOLIC BLOOD PRESSURE: 110 MMHG | WEIGHT: 154.54 LBS

## 2021-02-01 DIAGNOSIS — G40.409 GRAND MAL EPILEPSY, CONTROLLED (HCC): Primary | ICD-10-CM

## 2021-02-01 PROCEDURE — 99211 OFF/OP EST MAY X REQ PHY/QHP: CPT | Performed by: PSYCHIATRY & NEUROLOGY

## 2021-02-01 PROCEDURE — 99213 OFFICE O/P EST LOW 20 MIN: CPT | Performed by: PSYCHIATRY & NEUROLOGY

## 2021-02-01 RX ORDER — CHOLECALCIFEROL (VITAMIN D3) 125 MCG
5000 CAPSULE ORAL DAILY
Qty: 30 CAP | Refills: 0 | COMMUNITY
Start: 2021-02-01 | End: 2023-08-02

## 2021-02-01 RX ORDER — LEVETIRACETAM 500 MG/1
500 TABLET ORAL 2 TIMES DAILY
Qty: 180 TAB | Refills: 3 | Status: SHIPPED | OUTPATIENT
Start: 2021-02-01 | End: 2022-04-11

## 2021-02-01 ASSESSMENT — ENCOUNTER SYMPTOMS
BACK PAIN: 0
SEIZURES: 0
LOSS OF CONSCIOUSNESS: 0
FALLS: 0

## 2021-02-01 ASSESSMENT — FIBROSIS 4 INDEX: FIB4 SCORE: 0.68

## 2021-02-01 ASSESSMENT — PATIENT HEALTH QUESTIONNAIRE - PHQ9: CLINICAL INTERPRETATION OF PHQ2 SCORE: 0

## 2021-02-01 NOTE — PROGRESS NOTES
"Subjective:      Sanjay Newberry is a 40 y.o. male who presents for annual follow-up, with a history of generalized seizures.    HPI    Sanjay has continued to do well.  Over the last year, always compliant with Keppra 500 mg, twice daily, he has had no recurrences.  He has tolerated the drug without issues of irritability, drowsiness, tremor or GI distortion.  We did finally get CBC and CMP drawn last February, 2020, these were normal.  Keppra level was 6, vitamin D 15.    Medical, surgical and family histories are reviewed, there are no new drug allergies.  He is on Keppra 500 mg, twice daily.    Review of Systems   Musculoskeletal: Negative for back pain, falls and joint pain.   Neurological: Negative for seizures and loss of consciousness.   All other systems reviewed and are negative.       Objective:     /70 (BP Location: Right arm, Patient Position: Sitting, BP Cuff Size: Adult)   Pulse 71   Temp 36.9 °C (98.4 °F) (Temporal)   Ht 1.753 m (5' 9\")   Wt 70.1 kg (154 lb 8.7 oz)   SpO2 100%   BMI 22.82 kg/m²      Physical Exam    He appears in no acute distress.  Vital signs are stable.  There is no malar rash.  The neck is supple, range of motion is full.  Cardiac evaluation is unremarkable.    Including mental status, cranial nerves, musculoskeletal, reflex, coordination, and since evaluations, the full neurologic examination is done and reveals no evidence of deficits or toxicities.     Assessment/Plan:     1. Grand mal epilepsy, controlled (HCC)  Clinically stable, even though the drug level is a little low (\"normal\" range 12-46), this was drawn only as a baseline.  He has had no seizure recurrences, thus there is no reason to alter his regimen.  He is tolerating the drug at this dose.  He understands the circumstances under which to call the office for seizure recurrence, he knows what lowers thresholds, and he certainly understands the need for compliance.    As for the low vitamin D, he was told " to take an over-the-counter 5000 unit supplement on a daily basis.  The rationale for this was reviewed.  We will check another vitamin D 25-hydroxy level when he shows up for follow-up.  He has had no problems with frequent bone breaks, arthralgias, etc.    - levETIRAcetam (KEPPRA) 500 MG Tab; Take 1 Tab by mouth 2 times a day.  Dispense: 180 Tab; Refill: 3    Time: 20 minutes spent face-to-face for exam, review, discussion, and education, of this over 50% of the time spent counseling and coordinating care.

## 2021-03-26 ENCOUNTER — OFFICE VISIT (OUTPATIENT)
Dept: URGENT CARE | Facility: CLINIC | Age: 41
End: 2021-03-26
Payer: COMMERCIAL

## 2021-03-26 VITALS
WEIGHT: 156.8 LBS | SYSTOLIC BLOOD PRESSURE: 112 MMHG | DIASTOLIC BLOOD PRESSURE: 70 MMHG | BODY MASS INDEX: 23.22 KG/M2 | OXYGEN SATURATION: 97 % | HEART RATE: 80 BPM | TEMPERATURE: 98.4 F | HEIGHT: 69 IN | RESPIRATION RATE: 16 BRPM

## 2021-03-26 DIAGNOSIS — L30.9 DERMATITIS: ICD-10-CM

## 2021-03-26 PROCEDURE — 99214 OFFICE O/P EST MOD 30 MIN: CPT | Performed by: NURSE PRACTITIONER

## 2021-03-26 RX ORDER — KETOCONAZOLE 20 MG/G
1 CREAM TOPICAL DAILY
Qty: 30 G | Refills: 0 | Status: SHIPPED | OUTPATIENT
Start: 2021-03-26 | End: 2021-04-02

## 2021-03-26 RX ORDER — TRIAMCINOLONE ACETONIDE 1 MG/G
1 CREAM TOPICAL 3 TIMES DAILY
Qty: 45 G | Refills: 0 | Status: SHIPPED | OUTPATIENT
Start: 2021-03-26 | End: 2021-04-02

## 2021-03-26 ASSESSMENT — ENCOUNTER SYMPTOMS
FEVER: 0
CONSTITUTIONAL NEGATIVE: 1

## 2021-03-26 ASSESSMENT — VISUAL ACUITY: OU: 1

## 2021-03-26 ASSESSMENT — FIBROSIS 4 INDEX: FIB4 SCORE: 0.68

## 2021-03-26 NOTE — PROGRESS NOTES
Subjective:     Sanjay Newberry is a 40 y.o. male who presents for Rash (x3wks, skin irritation on forehead, painful, itchy, spreading)       Rash  This is a new problem. The problem has been gradually worsening since onset. The rash is characterized by itchiness, dryness and scaling. Pertinent negatives include no fever. Past treatments include nothing.     Patient reports that 3 weeks ago, he started to experience a rash on his forehead.  Reports dryness, scaliness, and itchiness.  It is starting to spread outwards.  He is concerns of fungal infection.    Around 3 weeks ago, patient reports he was wearing a beanie as it was snowing.  He is not wearing that beanie anymore.    Patient was screened prior to rooming and denied COVID-19 diagnosis or contact with a person who has been diagnosed or is suspected to have COVID-19. During this visit, appropriate PPE was worn, hand hygiene was performed, and the patient and any visitors were masked.     PMH:  has a past medical history of Grand mal epilepsy, controlled ([facility]) and ICH (intracerebral hemorrhage) ([facility]).    MEDS:   Current Outpatient Medications:   •  triamcinolone acetonide (KENALOG) 0.1 % Cream, Apply 1 Application topically 3 times a day for 7 days., Disp: 45 g, Rfl: 0  •  ketoconazole (NIZORAL) 2 % Cream, Apply 1 Application topically every day for 7 days., Disp: 30 g, Rfl: 0  •  levETIRAcetam (KEPPRA) 500 MG Tab, Take 1 Tab by mouth 2 times a day., Disp: 180 Tab, Rfl: 3  •  cholecalciferol (VITAMIN D3 ULTRA STRENGTH) 5000 UNIT Cap, Take 1 Cap by mouth every day., Disp: 30 Cap, Rfl: 0  •  triamcinolone acetonide (KENALOG) 0.025 % Cream, Apply to affected area twice daily (Patient not taking: Reported on 2/1/2021), Disp: 100 g, Rfl: 1    ALLERGIES: No Known Allergies    SURGHX: History reviewed. No pertinent surgical history.    SOCHX:  reports that he has never smoked. He has never used smokeless tobacco. He reports that he does not drink alcohol and does not  "use drugs.     FH: Reviewed with patient, not pertinent to this visit.    Review of Systems   Constitutional: Negative.  Negative for fever and malaise/fatigue.   Skin: Positive for itching and rash.   All other systems reviewed and are negative.    Additional details per HPI.      Objective:     /70 (BP Location: Left arm, Patient Position: Sitting, BP Cuff Size: Adult)   Pulse 80   Temp 36.9 °C (98.4 °F) (Temporal)   Resp 16   Ht 1.753 m (5' 9\")   Wt 71.1 kg (156 lb 12.8 oz)   SpO2 97%   BMI 23.16 kg/m²     Physical Exam  Vitals reviewed.   Constitutional:       General: He is not in acute distress.     Appearance: He is well-developed. He is not ill-appearing or toxic-appearing.   HENT:      Head: Normocephalic.      Right Ear: External ear normal.      Left Ear: External ear normal.   Eyes:      General: Vision grossly intact.      Extraocular Movements: Extraocular movements intact.      Conjunctiva/sclera: Conjunctivae normal.   Cardiovascular:      Rate and Rhythm: Normal rate.   Pulmonary:      Effort: Pulmonary effort is normal. No respiratory distress.   Musculoskeletal:         General: No deformity. Normal range of motion.      Cervical back: Normal range of motion.   Skin:     General: Skin is warm and dry.      Coloration: Skin is not pale.      Findings: Rash present.      Comments: Dry, scaly, rough, darkened texture of forehead bilaterally extending to hairline, skin intact, no discharge   Neurological:      Mental Status: He is alert and oriented to person, place, and time.      Sensory: No sensory deficit.      Motor: No weakness.      Coordination: Coordination normal.   Psychiatric:         Behavior: Behavior normal. Behavior is cooperative.       Assessment/Plan:     1. Dermatitis  - triamcinolone acetonide (KENALOG) 0.1 % Cream; Apply 1 Application topically 3 times a day for 7 days.  Dispense: 45 g; Refill: 0  - ketoconazole (NIZORAL) 2 % Cream; Apply 1 Application topically " every day for 7 days.  Dispense: 30 g; Refill: 0    Discussed likely contact dermatitis from the beanie that patient was wearing 3 weeks ago.  He is no longer wearing that now.  Advised to identify/avoid possible allergens.  Rx as above sent electronically.  Return precautions discussed.    Differential diagnosis, natural history, supportive care, over-the-counter symptom management per 's instructions, close monitoring, and indications for immediate follow-up discussed.     Patient advised to: Return for 1) Symptoms that worsen/don't improve, or go to ER, 2) Follow up with primary care in 7-10 days.    All questions answered. Patient agrees with the plan of care.    Discharge summary provided.     Billing note: at least 30 minutes was allotted and spent for face-to-face care with the patient as well as coordination of care such as preparing for the visit, obtaining/reviewing history, reconciling outside information, performing an exam/evaluation, reviewing unique test results/external notes from unique sources, ordering/interpreting diagnostics, ordering/administering treatments, re-evaluating the patient, collaborating/communicating with other healthcare professionals, developing a plan of care, counseling/educating the patient/caregivers/family members, updating the medical record, and ensuring accurate documentation.

## 2022-04-10 DIAGNOSIS — G40.409 GRAND MAL EPILEPSY, CONTROLLED (HCC): ICD-10-CM

## 2022-04-11 RX ORDER — LEVETIRACETAM 500 MG/1
TABLET ORAL
Qty: 180 TABLET | Refills: 3 | Status: SHIPPED | OUTPATIENT
Start: 2022-04-11 | End: 2023-03-31

## 2023-03-31 DIAGNOSIS — G40.409 GRAND MAL EPILEPSY, CONTROLLED (HCC): ICD-10-CM

## 2023-03-31 RX ORDER — LEVETIRACETAM 500 MG/1
TABLET ORAL
Qty: 180 TABLET | Refills: 3 | Status: SHIPPED | OUTPATIENT
Start: 2023-03-31 | End: 2023-08-02 | Stop reason: SDUPTHER

## 2023-03-31 NOTE — TELEPHONE ENCOUNTER
Received request via: Pharmacy    Was the patient seen in the last year in this department? No    Does the patient have an active prescription (recently filled or refills available) for medication(s) requested? Yes.     Does the patient have [facility] and need 100 day supply (blood pressure, diabetes and cholesterol meds only)? Medication is not for cholesterol, blood pressure or diabetes    PATIENT HAS AN APPOINTMENT WITH YOU ON 08/02/23

## 2023-08-02 ENCOUNTER — OFFICE VISIT (OUTPATIENT)
Dept: NEUROLOGY | Facility: MEDICAL CENTER | Age: 43
End: 2023-08-02
Attending: PSYCHIATRY & NEUROLOGY
Payer: COMMERCIAL

## 2023-08-02 ENCOUNTER — TELEPHONE (OUTPATIENT)
Dept: NEUROLOGY | Facility: MEDICAL CENTER | Age: 43
End: 2023-08-02

## 2023-08-02 VITALS
TEMPERATURE: 98.1 F | HEART RATE: 69 BPM | HEIGHT: 70 IN | OXYGEN SATURATION: 98 % | BODY MASS INDEX: 22 KG/M2 | SYSTOLIC BLOOD PRESSURE: 110 MMHG | DIASTOLIC BLOOD PRESSURE: 62 MMHG | WEIGHT: 153.66 LBS

## 2023-08-02 DIAGNOSIS — G40.409 GRAND MAL EPILEPSY, CONTROLLED (HCC): Primary | ICD-10-CM

## 2023-08-02 PROCEDURE — 99211 OFF/OP EST MAY X REQ PHY/QHP: CPT | Performed by: PSYCHIATRY & NEUROLOGY

## 2023-08-02 PROCEDURE — 3074F SYST BP LT 130 MM HG: CPT | Performed by: PSYCHIATRY & NEUROLOGY

## 2023-08-02 PROCEDURE — 3078F DIAST BP <80 MM HG: CPT | Performed by: PSYCHIATRY & NEUROLOGY

## 2023-08-02 PROCEDURE — 99215 OFFICE O/P EST HI 40 MIN: CPT | Performed by: PSYCHIATRY & NEUROLOGY

## 2023-08-02 RX ORDER — LEVETIRACETAM 500 MG/1
500 TABLET ORAL 2 TIMES DAILY
Qty: 180 TABLET | Refills: 3 | Status: SHIPPED | OUTPATIENT
Start: 2023-08-02

## 2023-08-02 ASSESSMENT — ENCOUNTER SYMPTOMS
SEIZURES: 0
MEMORY LOSS: 0
TREMORS: 0
FALLS: 0
LOSS OF CONSCIOUSNESS: 0

## 2023-08-02 ASSESSMENT — PATIENT HEALTH QUESTIONNAIRE - PHQ9: CLINICAL INTERPRETATION OF PHQ2 SCORE: 0

## 2023-08-02 NOTE — PROGRESS NOTES
"Subjective     Sanjay Newberry is a 43 y.o. [place] male who presents for follow-up, after a 2.5-year hiatus, with a history of grand mal seizures.     KATHY Payne has been doing well.  His last seizure occurred in 2014, but follow-up EEG revealed an underlying susceptibility, and given his history of multiple recurrences while on medicine, we elected to keep him on drug.    He has had no problems tolerating Keppra 500 mg, twice daily.  He has been stable on this dosing.  He is compliant.  He denies any issues with sleepiness, personality changes, headaches, dizziness, falling, etc.    He did stop taking his vitamin D supplement, this in light of a very low level of 15 drawn back in February, 2020.  Keppra level at that same time was 6 but given his stability, it was simply drawn as a baseline.    Medical, surgical and family histories are reviewed, there are no new drug allergies.  He is still living with his sister Lizbeth and her family.  He works as a company .  He denies any atypical stressors.  He is driving safely.  He takes only Keppra 500 mg, twice daily.    Review of Systems   Constitutional:  Negative for malaise/fatigue.   Musculoskeletal:  Negative for falls.   Neurological:  Negative for tremors, seizures and loss of consciousness.   Psychiatric/Behavioral:  Negative for memory loss.    All other systems reviewed and are negative.    Objective     /62 (BP Location: Right arm, Patient Position: Sitting, BP Cuff Size: Adult)   Pulse 69   Temp 36.7 °C (98.1 °F) (Temporal)   Ht 1.778 m (5' 10\")   Wt 69.7 kg (153 lb 10.6 oz)   SpO2 98%   BMI 22.05 kg/m²      Physical Exam    He appears in no acute distress.  Vital signs are stable.  There is no malar rash.  His neck is supple.  Carotid pulses are present without asymmetry.  Cardiac evaluation reveals a regular rhythm.  There is no evidence of rash, and there is no lower extremity edema.     Neurological Exam    He is fully oriented.  Even " with language difficulties, there is no evidence of aphasia, apraxia, or inattention.    PERRLA/EOMI, visual fields are full to finger counting on confrontation bilaterally.  Facial movements are symmetric, sensory exam is intact to light touch.  The tongue and uvula are midline, there is no bulbar dysfunction.  Jaw movements are intact.  Shoulder shrug and head rotation are normal.    Musculoskeletal exam reveals normal tone throughout, there is no tremor, asterixis, or drift.  Strength is 5/5 throughout.  Reflex exam is unremarkable.    He stands easily, gait is normal and station and stride length, armswing is symmetric.  There is no appendicular dystaxia.  Repetitive movements are normal and symmetric in all 4 extremities in amplitude and frequency.    Sensory exam is intact to light touch.  Romberg is absent.    Assessment & Plan     1. 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  He was told that he will likely need to get back on vitamin D supplements.  We will recheck a level along with Keppra.  The latter is more of a baseline to make sure that it is stable when compared to his previous level from 3 years ago.  We do need to deal with the former given his young age and significant risk of osteopenia and osteoporosis while on medication for this disorder.  He was told we will contact him if this is the case.  Otherwise there is no reason to be in contact from our end, he knows to call the office if he has any seizure recurrences.  There are no constraints for him.  He was told that he needs to follow-up with me on an annual basis.    - VITAMIN D 25-HYDROXY; Future  - LEVETIRACETAM (KEPPRA), S  - levETIRAcetam (KEPPRA) 500 MG Tab; Take 1 Tablet by mouth 2 times a day.  Dispense: 180 Tablet; Refill: 3    Time: 40 minutes in total spent on patient care including pre-charting, record review, discussion with healthcare staff and  documentation.  This includes face-to-face time for exam, review, discussion, as well as counseling and coordinating care.

## 2023-08-02 NOTE — TELEPHONE ENCOUNTER
Levetiracetam (Keppra) 500 MG Tabs    RTS - IF REJ 76/78 CHECK QTY CALL [phone] NEXT AVAILABLE FILL DATE [id number] LAST FILL DT [id number] FILLED AT PHARMACY [facility] [id number],PHONE #[phone] (PHARMACY HELP DESK [phone]) NON-SPECIALTY DRUG [id number] - 08/02/2023 10:56am